# Patient Record
Sex: MALE | Race: WHITE | Employment: FULL TIME | URBAN - METROPOLITAN AREA
[De-identification: names, ages, dates, MRNs, and addresses within clinical notes are randomized per-mention and may not be internally consistent; named-entity substitution may affect disease eponyms.]

---

## 2024-03-09 ENCOUNTER — APPOINTMENT (OUTPATIENT)
Dept: RADIOLOGY | Facility: HOSPITAL | Age: 44
End: 2024-03-09
Payer: COMMERCIAL

## 2024-03-09 ENCOUNTER — HOSPITAL ENCOUNTER (EMERGENCY)
Facility: HOSPITAL | Age: 44
Discharge: HOME | End: 2024-03-09
Attending: STUDENT IN AN ORGANIZED HEALTH CARE EDUCATION/TRAINING PROGRAM
Payer: COMMERCIAL

## 2024-03-09 VITALS
HEART RATE: 90 BPM | TEMPERATURE: 97.5 F | RESPIRATION RATE: 18 BRPM | DIASTOLIC BLOOD PRESSURE: 104 MMHG | SYSTOLIC BLOOD PRESSURE: 149 MMHG | WEIGHT: 246.03 LBS | OXYGEN SATURATION: 98 %

## 2024-03-09 DIAGNOSIS — K11.20 SIALOADENITIS OF SUBMANDIBULAR GLAND: Primary | ICD-10-CM

## 2024-03-09 DIAGNOSIS — D72.819 LEUKOPENIA, UNSPECIFIED TYPE: ICD-10-CM

## 2024-03-09 LAB
ALBUMIN SERPL-MCNC: 4.4 G/DL (ref 3.5–5)
ALP BLD-CCNC: 86 U/L (ref 35–125)
ALT SERPL-CCNC: 31 U/L (ref 5–40)
ANION GAP SERPL CALC-SCNC: 9 MMOL/L
AST SERPL-CCNC: 18 U/L (ref 5–40)
BASOPHILS # BLD AUTO: 0.03 X10*3/UL (ref 0–0.1)
BASOPHILS NFR BLD AUTO: 0.9 %
BILIRUB SERPL-MCNC: 0.4 MG/DL (ref 0.1–1.2)
BUN SERPL-MCNC: 11 MG/DL (ref 8–25)
CALCIUM SERPL-MCNC: 9.2 MG/DL (ref 8.5–10.4)
CHLORIDE SERPL-SCNC: 104 MMOL/L (ref 97–107)
CO2 SERPL-SCNC: 26 MMOL/L (ref 24–31)
CREAT SERPL-MCNC: 1.1 MG/DL (ref 0.4–1.6)
EGFRCR SERPLBLD CKD-EPI 2021: 86 ML/MIN/1.73M*2
EOSINOPHIL # BLD AUTO: 0.04 X10*3/UL (ref 0–0.7)
EOSINOPHIL NFR BLD AUTO: 1.1 %
ERYTHROCYTE [DISTWIDTH] IN BLOOD BY AUTOMATED COUNT: 12.9 % (ref 11.5–14.5)
FLUAV RNA RESP QL NAA+PROBE: NOT DETECTED
FLUBV RNA RESP QL NAA+PROBE: NOT DETECTED
GLUCOSE SERPL-MCNC: 87 MG/DL (ref 65–99)
HCT VFR BLD AUTO: 46.3 % (ref 41–52)
HETEROPH AB SERPLBLD QL IA.RAPID: NEGATIVE
HGB BLD-MCNC: 15.5 G/DL (ref 13.5–17.5)
IMM GRANULOCYTES # BLD AUTO: 0.01 X10*3/UL (ref 0–0.7)
IMM GRANULOCYTES NFR BLD AUTO: 0.3 % (ref 0–0.9)
LACTATE BLDV-SCNC: 1.2 MMOL/L (ref 0.4–2)
LYMPHOCYTES # BLD AUTO: 0.95 X10*3/UL (ref 1.2–4.8)
LYMPHOCYTES NFR BLD AUTO: 27 %
MCH RBC QN AUTO: 28.1 PG (ref 26–34)
MCHC RBC AUTO-ENTMCNC: 33.5 G/DL (ref 32–36)
MCV RBC AUTO: 84 FL (ref 80–100)
MONOCYTES # BLD AUTO: 0.52 X10*3/UL (ref 0.1–1)
MONOCYTES NFR BLD AUTO: 14.8 %
NEUTROPHILS # BLD AUTO: 1.97 X10*3/UL (ref 1.2–7.7)
NEUTROPHILS NFR BLD AUTO: 55.9 %
NRBC BLD-RTO: 0 /100 WBCS (ref 0–0)
PLATELET # BLD AUTO: 204 X10*3/UL (ref 150–450)
POTASSIUM SERPL-SCNC: 4.1 MMOL/L (ref 3.4–5.1)
PROT SERPL-MCNC: 7.3 G/DL (ref 5.9–7.9)
RBC # BLD AUTO: 5.52 X10*6/UL (ref 4.5–5.9)
RSV RNA RESP QL NAA+PROBE: NOT DETECTED
S PYO DNA THROAT QL NAA+PROBE: NOT DETECTED
SARS-COV-2 RNA RESP QL NAA+PROBE: NOT DETECTED
SODIUM SERPL-SCNC: 139 MMOL/L (ref 133–145)
WBC # BLD AUTO: 3.5 X10*3/UL (ref 4.4–11.3)

## 2024-03-09 PROCEDURE — 85025 COMPLETE CBC W/AUTO DIFF WBC: CPT | Performed by: CLINICAL NURSE SPECIALIST

## 2024-03-09 PROCEDURE — 84075 ASSAY ALKALINE PHOSPHATASE: CPT | Performed by: CLINICAL NURSE SPECIALIST

## 2024-03-09 PROCEDURE — 86308 HETEROPHILE ANTIBODY SCREEN: CPT | Performed by: CLINICAL NURSE SPECIALIST

## 2024-03-09 PROCEDURE — 83605 ASSAY OF LACTIC ACID: CPT | Performed by: CLINICAL NURSE SPECIALIST

## 2024-03-09 PROCEDURE — 70491 CT SOFT TISSUE NECK W/DYE: CPT

## 2024-03-09 PROCEDURE — 87040 BLOOD CULTURE FOR BACTERIA: CPT | Mod: 59,TRILAB | Performed by: CLINICAL NURSE SPECIALIST

## 2024-03-09 PROCEDURE — 99284 EMERGENCY DEPT VISIT MOD MDM: CPT | Mod: 25

## 2024-03-09 PROCEDURE — 87637 SARSCOV2&INF A&B&RSV AMP PRB: CPT | Performed by: CLINICAL NURSE SPECIALIST

## 2024-03-09 PROCEDURE — 2500000004 HC RX 250 GENERAL PHARMACY W/ HCPCS (ALT 636 FOR OP/ED): Performed by: CLINICAL NURSE SPECIALIST

## 2024-03-09 PROCEDURE — 36415 COLL VENOUS BLD VENIPUNCTURE: CPT | Performed by: CLINICAL NURSE SPECIALIST

## 2024-03-09 PROCEDURE — 2500000001 HC RX 250 WO HCPCS SELF ADMINISTERED DRUGS (ALT 637 FOR MEDICARE OP): Performed by: CLINICAL NURSE SPECIALIST

## 2024-03-09 PROCEDURE — 70491 CT SOFT TISSUE NECK W/DYE: CPT | Performed by: STUDENT IN AN ORGANIZED HEALTH CARE EDUCATION/TRAINING PROGRAM

## 2024-03-09 PROCEDURE — 87651 STREP A DNA AMP PROBE: CPT | Performed by: CLINICAL NURSE SPECIALIST

## 2024-03-09 PROCEDURE — 96374 THER/PROPH/DIAG INJ IV PUSH: CPT

## 2024-03-09 PROCEDURE — 2550000001 HC RX 255 CONTRASTS: Performed by: STUDENT IN AN ORGANIZED HEALTH CARE EDUCATION/TRAINING PROGRAM

## 2024-03-09 RX ORDER — DEXAMETHASONE SODIUM PHOSPHATE 100 MG/10ML
10 INJECTION INTRAMUSCULAR; INTRAVENOUS ONCE
Status: COMPLETED | OUTPATIENT
Start: 2024-03-09 | End: 2024-03-09

## 2024-03-09 RX ORDER — AMOXICILLIN AND CLAVULANATE POTASSIUM 875; 125 MG/1; MG/1
1 TABLET, FILM COATED ORAL 2 TIMES DAILY
Qty: 20 TABLET | Refills: 0 | Status: SHIPPED | OUTPATIENT
Start: 2024-03-09 | End: 2024-03-19

## 2024-03-09 RX ORDER — AMOXICILLIN AND CLAVULANATE POTASSIUM 875; 125 MG/1; MG/1
1 TABLET, FILM COATED ORAL ONCE
Status: COMPLETED | OUTPATIENT
Start: 2024-03-09 | End: 2024-03-09

## 2024-03-09 RX ADMIN — DEXAMETHASONE SODIUM PHOSPHATE 10 MG: 10 INJECTION INTRAMUSCULAR; INTRAVENOUS at 16:51

## 2024-03-09 RX ADMIN — IOHEXOL 75 ML: 350 INJECTION, SOLUTION INTRAVENOUS at 19:10

## 2024-03-09 RX ADMIN — AMOXICILLIN AND CLAVULANATE POTASSIUM 1 TABLET: 875; 125 TABLET, FILM COATED ORAL at 20:48

## 2024-03-09 RX ADMIN — SODIUM CHLORIDE 1000 ML: 900 INJECTION, SOLUTION INTRAVENOUS at 16:51

## 2024-03-09 ASSESSMENT — COLUMBIA-SUICIDE SEVERITY RATING SCALE - C-SSRS
1. IN THE PAST MONTH, HAVE YOU WISHED YOU WERE DEAD OR WISHED YOU COULD GO TO SLEEP AND NOT WAKE UP?: NO
2. HAVE YOU ACTUALLY HAD ANY THOUGHTS OF KILLING YOURSELF?: NO
6. HAVE YOU EVER DONE ANYTHING, STARTED TO DO ANYTHING, OR PREPARED TO DO ANYTHING TO END YOUR LIFE?: NO

## 2024-03-09 ASSESSMENT — PAIN SCALES - GENERAL: PAINLEVEL_OUTOF10: 0 - NO PAIN

## 2024-03-09 ASSESSMENT — PAIN - FUNCTIONAL ASSESSMENT: PAIN_FUNCTIONAL_ASSESSMENT: 0-10

## 2024-03-09 ASSESSMENT — PAIN DESCRIPTION - PROGRESSION: CLINICAL_PROGRESSION: NOT CHANGED

## 2024-03-09 NOTE — ED PROVIDER NOTES
"Department of Emergency Medicine   ED  Provider Note  Admit Date/RoomTime: 3/9/2024  4:44 PM  ED [facility]: [facility]        History of Present Illness:  Chief Complaint   Patient presents with    Swollen Glands     Patient reports swollen lymph nodes in the throat. Reports sore throat yesterday and headache. State he \"feels better today\".         Brian Purdy is a 43 y.o. male denies chronic medical illnesses presenting to the ED for swollen lymph nodes, beginning last night.  Patient states  Here with sore throat fever hide cold chills but did not take his temperature then woke up this morning with swollen glands.  Was seen evaluated at the urgent care and sent directly to the emergency department for evaluation.  Patient reports he did not look in his throat.  He denies difficulty swallowing no drooling.  Complains of annoyance in his throat.  Complains of ear itching.  No cough congestion runny nose.  No ear pain.  His sore throat has resolved.  No abdominal pain nausea vomiting or diarrhea.  No rashes or sores and his fever has resolved.  States he feels better today than he did last night.  Review of Systems:   Pertinent positives and negatives are stated within HPI, all other systems reviewed and are negative.        --------------------------------------------- PAST HISTORY ---------------------------------------------  Past Medical History:  has no past medical history on file.  Past Surgical History:  has no past surgical history on file.  Social History:    Family History: family history is not on file.. Unless otherwise noted, family history is non contributory  The patient’s home medications have been reviewed.  Allergies: Patient has no known allergies.        ---------------------------------------------------PHYSICAL EXAM--------------------------------------    GENERAL APPEARANCE: Awake and alert.   VITAL SIGNS: As per the nurses' triage record.  Elevated blood pressure  HEENT: Normocephalic, " atraumatic. Extraocular muscles are intact. Pupils equal round and reactive to light. Conjunctiva are pink. Negative scleral icterus. Mucous membranes are moist. Tongue in the midline. Pharynx was without erythema or exudates, uvula midline airway patent  NECK: Soft Nontender and supple, full gross ROM, no meningeal signs.  Cervical and submandibular adenopathy noted.  No nuchal rigidity stridor or trismus noted.  Trachea midline  CHEST: Nontender to palpation.  Patient without scattered wheezing.  No use of accessory muscles or nasal flaring noted no pursed of breathing or tripod positioning noted.  Able to talk in complete sentence patient is a loose barky cough noted with deep breathing calms with rest  HEART: S1, S2. Regular rate and rhythm. No murmurs, gallops or rubs.  Strong and equal pulses in the extremities.   ABDOMEN: Soft, nondistended, positive bowel sounds, no palpable masses.  Diffuse tenderness  MUSCULCSKELETAL: . Full gross active range of motion. Ambulating on own with no acute difficulties  NEUROLOGICAL: Awake, alert and oriented x 3. Power intact in the upper and lower extremities. Sensation is intact to light touch in the upper and lower extremities.   IMMUNOLOGICAL: No lymphatic streaking noted   DERM: No petechiae, rashes, or ecchymoses.          ------------------------- NURSING NOTES AND VITALS REVIEWED ---------------------------  The nursing notes within the ED encounter and vital signs as below have been reviewed by myself  BP (!) 149/104   Pulse 90   Temp 36.4 °C (97.5 °F) (Temporal)   Resp 18   Wt 112 kg (246 lb 0.5 oz)   SpO2 98%     Oxygen Saturation Interpretation: 98% room air          The patient’s available past medical records and past encounters were reviewed.          -----------------------DIAGNOSTIC RESULTS------------------------  LABS:    Labs Reviewed   CBC WITH AUTO DIFFERENTIAL - Abnormal       Result Value    WBC 3.5 (*)     nRBC 0.0      RBC 5.52      Hemoglobin  15.5      Hematocrit 46.3      MCV 84      MCH 28.1      MCHC 33.5      RDW 12.9      Platelets 204      Neutrophils % 55.9      Immature Granulocytes %, Automated 0.3      Lymphocytes % 27.0      Monocytes % 14.8      Eosinophils % 1.1      Basophils % 0.9      Neutrophils Absolute 1.97      Immature Granulocytes Absolute, Automated 0.01      Lymphocytes Absolute 0.95 (*)     Monocytes Absolute 0.52      Eosinophils Absolute 0.04      Basophils Absolute 0.03     GROUP A STREPTOCOCCUS, PCR - Normal    Group A Strep PCR Not Detected     SARS-COV-2 AND INFLUENZA A/B PCR - Normal    Flu A Result Not Detected      Flu B Result Not Detected      Coronavirus 2019, PCR Not Detected      Narrative:     This assay has received FDA Emergency Use Authorization (EUA) and  is only authorized for the duration of time that circumstances exist to justify the authorization of the emergency use of in vitro diagnostic tests for the detection of SARS-CoV-2 virus and/or diagnosis of COVID-19 infection under section 564(b)(1) of the Act, 21 U.S.C. 360bbb-3(b)(1). Testing for SARS-CoV-2 is only recommended for patients who meet current clinical and/or epidemiological criteria as defined by federal, state, or local public health directives. This assay is an in vitro diagnostic nucleic acid amplification test for the qualitative detection of SARS-CoV-2, Influenza A, and Influenza B from nasopharyngeal specimens and has been validated for use at [facility]. Negative results do not preclude COVID-19 infections or Influenza A/B infections, and should not be used as the sole basis for diagnosis, treatment, or other management decisions. If Influenza A/B and RSV PCR results are negative, testing for Parainfluenza virus, Adenovirus and Metapneumovirus is routinely performed for [facility] [place], and is available on other patients by placing an add-on request.    RSV PCR - Normal    RSV  PCR Not Detected      Narrative:     This assay is an FDA-cleared, in vitro diagnostic nucleic acid amplification test for the detection of RSV from nasopharyngeal specimens, and has been validated for use at [facility]. Negative results do not preclude RSV infections, and should not be used as the sole basis for diagnosis, treatment, or other management decisions. If Influenza A/B and RSV PCR results are negative, testing for Parainfluenza virus, Adenovirus and Metapneumovirus is routinely performed for pediatric oncology and intensive care inpatients at [facility] [place], and is available on other patients by placing an add-on request.       MONONUCLEOSIS SCREEN (HETEROPHILE ANTIBODY) - Normal    Mononucleosis Screen Negative     BLOOD GAS LACTIC ACID, VENOUS - Normal    POCT Lactate, Venous 1.2     COMPREHENSIVE METABOLIC PANEL - Normal    Glucose 87      Sodium 139      Potassium 4.1      Chloride 104      Bicarbonate 26      Urea Nitrogen 11      Creatinine 1.10      eGFR 86      Calcium 9.2      Albumin 4.4      Alkaline Phosphatase 86      Total Protein 7.3      AST 18      Bilirubin, Total 0.4      ALT 31      Anion Gap 9     BLOOD CULTURE   BLOOD CULTURE       As interpreted by me, the above displayed labs are abnormal. All other labs obtained during this visit were within normal range or not returned as of this dictation.        CT soft tissue neck w IV contrast   Final Result   1. Symmetric enlargement of the submandibular glands bilaterally   which demonstrate some hyperemia and edema with subtle fat stranding   present in the adjacent submandibular compartments, submental   compartment, and in the anterior neck fat overlying the strap   muscles, without evidence of rim enhancing fluid collections, soft   tissue gas, or radiopaque sialolithiasis. Findings are suggestive of   bilateral submandibular sialoadenitis further clinical correlation is   recommended.   2. Mild thickening with somewhat  indistinct appearance of the mucosal   pharyngeal space in the oropharynx, without evidence of tonsillar   enlargement, may represent extension of the reactive/inflammatory   soft tissue changes due to submandibular findings described above   versus pharyngitis. No significant airway narrowing is appreciated.   3. Mild cutaneous fat stranding in the anterior neck with the   adjacent platysmal thickening but without overlying skin thickening   favored to represent reactive inflammatory changes.   4. No enlarged lymphadenopathy is present.        MACRO:   None        Signed by: Chris Zabala 3/9/2024 8:00 PM   Dictation workstation:   [id number]              CT soft tissue neck w IV contrast   Final Result   1. Symmetric enlargement of the submandibular glands bilaterally   which demonstrate some hyperemia and edema with subtle fat stranding   present in the adjacent submandibular compartments, submental   compartment, and in the anterior neck fat overlying the strap   muscles, without evidence of rim enhancing fluid collections, soft   tissue gas, or radiopaque sialolithiasis. Findings are suggestive of   bilateral submandibular sialoadenitis further clinical correlation is   recommended.   2. Mild thickening with somewhat indistinct appearance of the mucosal   pharyngeal space in the oropharynx, without evidence of tonsillar   enlargement, may represent extension of the reactive/inflammatory   soft tissue changes due to submandibular findings described above   versus pharyngitis. No significant airway narrowing is appreciated.   3. Mild cutaneous fat stranding in the anterior neck with the   adjacent platysmal thickening but without overlying skin thickening   favored to represent reactive inflammatory changes.   4. No enlarged lymphadenopathy is present.        MACRO:   None        Signed by: Chris Zabala 3/9/2024 8:00 PM   Dictation workstation:   [id number]               ------------------------------ ED COURSE/MEDICAL DECISION MAKING----------------------  Medical Decision Making:   Exam: A medically appropriate exam performed, outlined above, given the known history and presentation.    History obtained from: Review medical record nursing notes patient patient family      Social Determinants of Health considered during this visit: Patient takes care of himself at home.  Children were sick last week with stomach flu.  At the school multiple upper respiratory infections going around      PAST MEDICAL HISTORY/Chronic Conditions Affecting Care     has no past medical history on file.       CC/HPI Summary, Social Determinants of health, Records Reviewed, DDx, testing done/not done, ED Course, Reassessment, disposition considerations/shared decision making with patient, consults, disposition:   Presents with swollen glands sore throat  Plan  Decadron  Normal saline  Blood cultures  Lactic acid  Strep  Mono  RSV  COVID flu  CT soft tissue neck 1. Symmetric enlargement of the submandibular glands bilaterally  which demonstrate some hyperemia and edema with subtle fat stranding  present in the adjacent submandibular compartments, submental  compartment, and in the anterior neck fat overlying the strap  muscles, without evidence of rim enhancing fluid collections, soft  tissue gas, or radiopaque sialolithiasis. Findings are suggestive of  bilateral submandibular sialoadenitis further clinical correlation is  recommended.  2. Mild thickening with somewhat indistinct appearance of the mucosal  pharyngeal space in the oropharynx, without evidence of tonsillar  enlargement, may represent extension of the reactive/inflammatory  soft tissue changes due to submandibular findings described above  versus pharyngitis. No significant airway narrowing is appreciated.  3. Mild cutaneous fat stranding in the anterior neck with the  adjacent platysmal thickening but without overlying skin  "thickening  favored to represent reactive inflammatory changes.  4. No enlarged lymphadenopathy is present.    Medical Decision Making/Differential Diagnosis:  Differentials include but not limited to viral illness versus flu versus COVID versus RSV versus abscess versus cervical lymphadenopathy versus mono versus goiter versus strep versus medication reaction  White blood cell count 3.5  Hemoglobin 15.5  Lymphocytes low at 0.95  Lactic acid 1.2  Mono negative  RSV negative  COVID flu negative  Electrolytes within normal limits  LFTs within normal limits  Strep negative  Patient presented with bilateral swollen glands.  Received Decadron for inflammation CT of the neck consistent with sialoadenitis patient was medicated with Augmentin in the emergency department.  Advised to suck on sour candy.  Discussed case with ENT.  Patient has no airway compromise.  He is not tachypneic tachycardic or hypotensive.  He is not hypoxic.  Airway is patent.  COVID flu negative RSV negative Mono negative strep negative.  White blood cell count is a little patient is not anemic.  Electrolytes within normal limits LFTs within normal limits normal renal function.  Patient seen and evaluated with attending physician Dr. Rosenthal discharge instructions discussed with patient by attending physician  Patient advised on supportive care measures at home follow-up with ENT return to the emergency department any worsening symptoms or concerns verbalizes understanding discharged agreeable to discharge  PROCEDURES  Unless otherwise noted below, none      CONSULTS:   None      ED Course as of 03/09/24 2236   Sat Mar 09, 2024   2026 Spoke with Dr. CAROLYN trujillo ENT .  Recommend Augmentin.  Massaging the area to see if there is any puslike drainage coming from the salivary glands.  Recommends for provider to try at this time.  Notify radiologist to see if there is any tooth involvement or erosion of the jaw.  Sialagogues such as lemon juice something sour " to help salivate.  If patient has no erosion of the bone no difficulty swallowing no airway compromise recommend discharge with close follow-up on antibiotic therapy..  Also recommend massaging the jaw and warm compresses [TB]   2039 Spoke with Dr. Melo radiologist did not appreciate any bony erosion or periodontal disease. [TB]      ED Course User Index  [TB] CIARAN Arias-CNP         Diagnoses as of 03/09/24 2236   Sialoadenitis of submandibular gland   Leukopenia, unspecified type         This patient has remained hemodynamically stable during their ED course.      Critical Care: none        Counseling:  The emergency provider has spoken with the patient and family and discussed today’s results, in addition to providing specific details for the plan of care and counseling regarding the diagnosis and prognosis.  Questions are answered at this time and they are agreeable with the plan.         --------------------------------- IMPRESSION AND DISPOSITION ---------------------------------    IMPRESSION  1. Sialoadenitis of submandibular gland    2. Leukopenia, unspecified type        DISPOSITION  Disposition: Discharge home  Patient condition is stable        NOTE: This report was transcribed using voice recognition software. Every effort was made to ensure accuracy; however, inadvertent computerized transcription errors may be present      ANIVAL Arias  03/09/24 2232       ANIVAL Arias  03/09/24 2236

## 2024-03-09 NOTE — Clinical Note
Brian Purdy was seen and treated in our emergency department on 3/9/2024.  He may return to work on 03/11/2024.  1-3 days if needed      If you have any questions or concerns, please don't hesitate to call.      Reina Rosenthal MD

## 2024-03-10 NOTE — DISCHARGE INSTRUCTIONS
Warm compresses 4 times a day  Massage the jaw  Taken about until completed.  Take antibiotic with food, full glass of water and supplement yogurt into diet, side effect of diarrhea  Follow-up with primary care physician in 2 days for reevaluation  Follow-up with ENT with no improvement within 1 week  Suck on sour candy.  Lemon drops.  Lemon juice to help with salivating  Your blood pressure was elevated.  Follow-up with primary care physician for reevaluation.  Journal your blood pressure daily return to emergency department any worsening symptoms or concerns

## 2024-03-14 LAB
BACTERIA BLD CULT: NORMAL
BACTERIA BLD CULT: NORMAL

## 2024-06-17 ENCOUNTER — APPOINTMENT (OUTPATIENT)
Dept: OTOLARYNGOLOGY | Facility: CLINIC | Age: 44
End: 2024-06-17
Payer: COMMERCIAL